# Patient Record
Sex: MALE | Race: WHITE
[De-identification: names, ages, dates, MRNs, and addresses within clinical notes are randomized per-mention and may not be internally consistent; named-entity substitution may affect disease eponyms.]

---
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
## 2021-10-26 NOTE — XR
EXAMINATION TYPE: XR nasal bone

 

DATE OF EXAM: 3/16/2018

 

COMPARISON: NONE

 

HISTORY: Pain

 

TECHNIQUE: 3 views obtained.

 

FINDINGS: Nasal bridge is intact. Maxillary styloid intact. There is a nasal septal deviation.

 

IMPRESSION: No acute fracture. Patients mother would like to be contacted once results from covid are in, needs clearance for child to return to . Please call at [phone],[facility].

## 2022-01-30 ENCOUNTER — HOSPITAL ENCOUNTER (EMERGENCY)
Dept: HOSPITAL 47 - EC | Age: 22
Discharge: HOME | End: 2022-01-30
Payer: COMMERCIAL

## 2022-01-30 VITALS — TEMPERATURE: 97 F | HEART RATE: 60 BPM

## 2022-01-30 VITALS — DIASTOLIC BLOOD PRESSURE: 71 MMHG | SYSTOLIC BLOOD PRESSURE: 121 MMHG | RESPIRATION RATE: 18 BRPM

## 2022-01-30 DIAGNOSIS — K52.9: ICD-10-CM

## 2022-01-30 DIAGNOSIS — E86.0: Primary | ICD-10-CM

## 2022-01-30 LAB
ALBUMIN SERPL-MCNC: 5.3 G/DL (ref 3.5–5)
ALP SERPL-CCNC: 71 U/L (ref 38–126)
ALT SERPL-CCNC: 20 U/L (ref 4–49)
ANION GAP SERPL CALC-SCNC: 12 MMOL/L
AST SERPL-CCNC: 33 U/L (ref 17–59)
BASOPHILS # BLD AUTO: 0 K/UL (ref 0–0.2)
BASOPHILS NFR BLD AUTO: 0 %
BUN SERPL-SCNC: 20 MG/DL (ref 9–20)
CALCIUM SPEC-MCNC: 10.7 MG/DL (ref 8.4–10.2)
CHLORIDE SERPL-SCNC: 103 MMOL/L (ref 98–107)
CO2 SERPL-SCNC: 23 MMOL/L (ref 22–30)
EOSINOPHIL # BLD AUTO: 0 K/UL (ref 0–0.7)
EOSINOPHIL NFR BLD AUTO: 0 %
ERYTHROCYTE [DISTWIDTH] IN BLOOD BY AUTOMATED COUNT: 5.36 M/UL (ref 4.3–5.9)
ERYTHROCYTE [DISTWIDTH] IN BLOOD: 13 % (ref 11.5–15.5)
GLUCOSE SERPL-MCNC: 104 MG/DL (ref 74–99)
HCT VFR BLD AUTO: 48.2 % (ref 39–53)
HGB BLD-MCNC: 16.8 GM/DL (ref 13–17.5)
KETONES UR QL STRIP.AUTO: (no result)
LDH SPEC-CCNC: 512 U/L (ref 313–618)
LIPASE SERPL-CCNC: 69 U/L (ref 23–300)
LYMPHOCYTES # SPEC AUTO: 1.6 K/UL (ref 1–4.8)
LYMPHOCYTES NFR SPEC AUTO: 16 %
MAGNESIUM SPEC-SCNC: 1.9 MG/DL (ref 1.6–2.3)
MCH RBC QN AUTO: 31.4 PG (ref 25–35)
MCHC RBC AUTO-ENTMCNC: 34.9 G/DL (ref 31–37)
MCV RBC AUTO: 90 FL (ref 80–100)
MONOCYTES # BLD AUTO: 0.3 K/UL (ref 0–1)
MONOCYTES NFR BLD AUTO: 3 %
NEUTROPHILS # BLD AUTO: 7.8 K/UL (ref 1.3–7.7)
NEUTROPHILS NFR BLD AUTO: 78 %
PH UR: 6.5 [PH] (ref 5–8)
PLATELET # BLD AUTO: 350 K/UL (ref 150–450)
POTASSIUM SERPL-SCNC: 4.5 MMOL/L (ref 3.5–5.1)
PROT SERPL-MCNC: 8.8 G/DL (ref 6.3–8.2)
PROT UR QL: (no result)
RBC UR QL: <1 /HPF (ref 0–5)
SODIUM SERPL-SCNC: 138 MMOL/L (ref 137–145)
SP GR UR: 1.04 (ref 1–1.03)
SQUAMOUS UR QL AUTO: <1 /HPF (ref 0–4)
UROBILINOGEN UR QL STRIP: <2 MG/DL (ref ?–2)
WBC # BLD AUTO: 10 K/UL (ref 4–11)
WBC # UR AUTO: <1 /HPF (ref 0–5)

## 2022-01-30 PROCEDURE — 84443 ASSAY THYROID STIM HORMONE: CPT

## 2022-01-30 PROCEDURE — 87591 N.GONORRHOEAE DNA AMP PROB: CPT

## 2022-01-30 PROCEDURE — 96361 HYDRATE IV INFUSION ADD-ON: CPT

## 2022-01-30 PROCEDURE — 82550 ASSAY OF CK (CPK): CPT

## 2022-01-30 PROCEDURE — 96374 THER/PROPH/DIAG INJ IV PUSH: CPT

## 2022-01-30 PROCEDURE — 87086 URINE CULTURE/COLONY COUNT: CPT

## 2022-01-30 PROCEDURE — 99284 EMERGENCY DEPT VISIT MOD MDM: CPT

## 2022-01-30 PROCEDURE — 93005 ELECTROCARDIOGRAM TRACING: CPT

## 2022-01-30 PROCEDURE — 85025 COMPLETE CBC W/AUTO DIFF WBC: CPT

## 2022-01-30 PROCEDURE — 36415 COLL VENOUS BLD VENIPUNCTURE: CPT

## 2022-01-30 PROCEDURE — 80053 COMPREHEN METABOLIC PANEL: CPT

## 2022-01-30 PROCEDURE — 81001 URINALYSIS AUTO W/SCOPE: CPT

## 2022-01-30 PROCEDURE — 96375 TX/PRO/DX INJ NEW DRUG ADDON: CPT

## 2022-01-30 PROCEDURE — 83735 ASSAY OF MAGNESIUM: CPT

## 2022-01-30 PROCEDURE — 83615 LACTATE (LD) (LDH) ENZYME: CPT

## 2022-01-30 PROCEDURE — 86140 C-REACTIVE PROTEIN: CPT

## 2022-01-30 PROCEDURE — 84100 ASSAY OF PHOSPHORUS: CPT

## 2022-01-30 PROCEDURE — 87491 CHLMYD TRACH DNA AMP PROBE: CPT

## 2022-01-30 PROCEDURE — 83690 ASSAY OF LIPASE: CPT

## 2022-01-30 NOTE — ED
Dizziness HPI





- General


Chief Complaint: Dizziness


Stated Complaint: Dizziness


Time Seen by Provider: 01/30/22 02:12


Source: patient, RN notes reviewed, old records reviewed


Mode of arrival: ambulatory


Limitations: no limitations





- History of Present Illness


Initial Comments: 





This is a 21-year-old male to the emergency department for evaluation of dizzi

ness lightheadedness.  Patient does admit to nausea vomiting diarrhea, weakness 

and not feeling well.  No recent travel history or sick contacts no fevers no 

other complaints.  Main symptoms are feeling sick for the last 12 hours or so 

progressively worsening and pain around his bladder area.


MD Complaint: dizziness, lightheadedness


-: hour(s)


Timing: gradual onset, waxing/waning


Description: lightheadedness, near-syncope


History of Same: No


History of Trauma: No


Severity: moderate


Improves With: remaining still


Worsens With: movement, exertion


Associated Symptoms: loss of appetite, weakness





- Related Data


                                Home Medications











 Medication  Instructions  Recorded  Confirmed


 


Ibuprofen [Motrin Ib] 600 mg PO Q6H PRN 07/16/18 10/15/19








                                  Previous Rx's











 Medication  Instructions  Recorded


 


Meclizine [Antivert] 25 mg PO TID PRN #15 tab 01/31/22











                                    Allergies











Allergy/AdvReac Type Severity Reaction Status Date / Time


 


No Known Allergies Allergy   Verified 01/31/22 10:51














Review of Systems


ROS Statement: 


Those systems with pertinent positive or pertinent negative responses have been 

documented in the HPI.





ROS Other: All systems not noted in ROS Statement are negative.





Past Medical History


Past Medical History: No Reported History


History of Any Multi-Drug Resistant Organisms: None Reported


Past Surgical History: No Surgical Hx Reported


Past Psychological History: No Psychological Hx Reported


Smoking Status: Never smoker


Past Alcohol Use History: Rare


Past Drug Use History: None Reported





General Exam


Limitations: no limitations


General appearance: alert, in no apparent distress


Head exam: Present: atraumatic, normocephalic, normal inspection


Eye exam: Present: normal appearance, PERRL, EOMI.  Absent: scleral icterus, 

conjunctival injection, periorbital swelling


ENT exam: Present: normal exam, mucous membranes moist


Neck exam: Present: normal inspection.  Absent: tenderness, meningismus, 

lymphadenopathy


Respiratory exam: Present: normal lung sounds bilaterally.  Absent: respiratory 

distress, wheezes, rales, rhonchi, stridor


Cardiovascular Exam: Present: regular rate, normal rhythm, normal heart sounds. 

 Absent: systolic murmur, diastolic murmur, rubs, gallop, clicks


GI/Abdominal exam: Present: soft, normal bowel sounds.  Absent: distended, 

tenderness, guarding, rebound, rigid


Extremities exam: Present: normal inspection, full ROM, normal capillary refill.

  Absent: tenderness, pedal edema, joint swelling, calf tenderness


Back exam: Present: normal inspection


Neurological exam: Present: alert, oriented X3, CN II-XII intact


Psychiatric exam: Present: normal affect, normal mood


Skin exam: Present: warm, dry, intact, normal color.  Absent: rash





Course


                                   Vital Signs











  01/30/22 01/30/22 01/30/22





  01:58 03:12 04:00


 


Temperature 96.9 F L  97 F L


 


Pulse Rate 57 L 57 L 60


 


Respiratory 20 20 22





Rate   


 


Blood Pressure 140/83 116/67 125/60


 


O2 Sat by Pulse 99 100 97





Oximetry   














  01/30/22





  04:34


 


Temperature 97 F L


 


Pulse Rate 60


 


Respiratory 18





Rate 


 


Blood Pressure 121/71


 


O2 Sat by Pulse 97





Oximetry 














- Reevaluation(s)


Reevaluation #1: 





Medical record is reviewed





Symptoms improved here in the ER





Patient informed results and questions answered








EKG Findings





- EKG Comments:


EKG Findings:: EKG is sinus bradycardia 59  QRS 90 





Medical Decision Making





- Medical Decision Making





21-year-old male presenting with dizziness.  Patient does have episodes of 

bradycardia.  Patient is nauseous and vomiting with gastroenteritis.  Patient 

will be given supportive care and can be discharged home currently feeling 

better





- Lab Data


Result diagrams: 


                                 01/30/22 02:27





                                 01/30/22 02:27


                                   Lab Results











  01/30/22 01/30/22 01/30/22 Range/Units





  02:14 02:14 02:27 


 


WBC    10.0  (4.0-11.0)  k/uL


 


RBC    5.36  (4.30-5.90)  m/uL


 


Hgb    16.8  (13.0-17.5)  gm/dL


 


Hct    48.2  (39.0-53.0)  %


 


MCV    90.0  (80.0-100.0)  fL


 


MCH    31.4  (25.0-35.0)  pg


 


MCHC    34.9  (31.0-37.0)  g/dL


 


RDW    13.0  (11.5-15.5)  %


 


Plt Count    350  (150-450)  k/uL


 


MPV    6.4  


 


Neutrophils %    78  %


 


Lymphocytes %    16  %


 


Monocytes %    3  %


 


Eosinophils %    0  %


 


Basophils %    0  %


 


Neutrophils #    7.8 H  (1.3-7.7)  k/uL


 


Lymphocytes #    1.6  (1.0-4.8)  k/uL


 


Monocytes #    0.3  (0-1.0)  k/uL


 


Eosinophils #    0.0  (0-0.7)  k/uL


 


Basophils #    0.0  (0-0.2)  k/uL


 


Sodium     (137-145)  mmol/L


 


Potassium     (3.5-5.1)  mmol/L


 


Chloride     ()  mmol/L


 


Carbon Dioxide     (22-30)  mmol/L


 


Anion Gap     mmol/L


 


BUN     (9-20)  mg/dL


 


Creatinine     (0.66-1.25)  mg/dL


 


Est GFR (CKD-EPI)AfAm     (>60 ml/min/1.73 sqM)  


 


Est GFR (CKD-EPI)NonAf     (>60 ml/min/1.73 sqM)  


 


Glucose     (74-99)  mg/dL


 


Calcium     (8.4-10.2)  mg/dL


 


Phosphorus     (2.5-4.5)  mg/dL


 


Magnesium     (1.6-2.3)  mg/dL


 


Total Bilirubin     (0.2-1.3)  mg/dL


 


AST     (17-59)  U/L


 


ALT     (4-49)  U/L


 


Alkaline Phosphatase     ()  U/L


 


Lactate Dehydrogenase     (313-618)  U/L


 


Creatine Kinase     ()  U/L


 


C-Reactive Protein     (<1.0)  mg/dL


 


Total Protein     (6.3-8.2)  g/dL


 


Albumin     (3.5-5.0)  g/dL


 


Lipase     ()  U/L


 


TSH     (0.465-4.680)  mIU/L


 


Urine Color  Yellow    


 


Urine Appearance  Clear    (Clear)  


 


Urine pH  6.5    (5.0-8.0)  


 


Ur Specific Gravity  1.037 H    (1.001-1.035)  


 


Urine Protein  1+ H    (Negative)  


 


Urine Glucose (UA)  Negative    (Negative)  


 


Urine Ketones  3+ H    (Negative)  


 


Urine Blood  Negative    (Negative)  


 


Urine Nitrite  Negative    (Negative)  


 


Urine Bilirubin  Negative    (Negative)  


 


Urine Urobilinogen  <2.0    (<2.0)  mg/dL


 


Ur Leukocyte Esterase  Negative    (Negative)  


 


Urine RBC  <1    (0-5)  /hpf


 


Urine WBC  <1    (0-5)  /hpf


 


Ur Squamous Epith Cells  <1    (0-4)  /hpf


 


Urine Mucus  Many H    (None)  /hpf


 


Chlamydia Source   Urine   


 


Chlamydia DNA (PCR)   Negative   (Neg,Equiv)  


 


N. gonorrhoeae Source   Urine   


 


N.gonorrhoeae DNA Probe   Negative   (Neg,Equiv)  














  01/30/22 01/30/22 Range/Units





  02:27 03:13 


 


WBC    (4.0-11.0)  k/uL


 


RBC    (4.30-5.90)  m/uL


 


Hgb    (13.0-17.5)  gm/dL


 


Hct    (39.0-53.0)  %


 


MCV    (80.0-100.0)  fL


 


MCH    (25.0-35.0)  pg


 


MCHC    (31.0-37.0)  g/dL


 


RDW    (11.5-15.5)  %


 


Plt Count    (150-450)  k/uL


 


MPV    


 


Neutrophils %    %


 


Lymphocytes %    %


 


Monocytes %    %


 


Eosinophils %    %


 


Basophils %    %


 


Neutrophils #    (1.3-7.7)  k/uL


 


Lymphocytes #    (1.0-4.8)  k/uL


 


Monocytes #    (0-1.0)  k/uL


 


Eosinophils #    (0-0.7)  k/uL


 


Basophils #    (0-0.2)  k/uL


 


Sodium  138   (137-145)  mmol/L


 


Potassium  4.5   (3.5-5.1)  mmol/L


 


Chloride  103   ()  mmol/L


 


Carbon Dioxide  23   (22-30)  mmol/L


 


Anion Gap  12   mmol/L


 


BUN  20   (9-20)  mg/dL


 


Creatinine  0.86   (0.66-1.25)  mg/dL


 


Est GFR (CKD-EPI)AfAm  >90   (>60 ml/min/1.73 sqM)  


 


Est GFR (CKD-EPI)NonAf  >90   (>60 ml/min/1.73 sqM)  


 


Glucose  104 H   (74-99)  mg/dL


 


Calcium  10.7 H   (8.4-10.2)  mg/dL


 


Phosphorus  4.3   (2.5-4.5)  mg/dL


 


Magnesium  1.9   (1.6-2.3)  mg/dL


 


Total Bilirubin  1.5 H   (0.2-1.3)  mg/dL


 


AST  33   (17-59)  U/L


 


ALT  20   (4-49)  U/L


 


Alkaline Phosphatase  71   ()  U/L


 


Lactate Dehydrogenase  512   (313-618)  U/L


 


Creatine Kinase   80  ()  U/L


 


C-Reactive Protein  <0.5   (<1.0)  mg/dL


 


Total Protein  8.8 H   (6.3-8.2)  g/dL


 


Albumin  5.3 H   (3.5-5.0)  g/dL


 


Lipase  69   ()  U/L


 


TSH  0.650   (0.465-4.680)  mIU/L


 


Urine Color    


 


Urine Appearance    (Clear)  


 


Urine pH    (5.0-8.0)  


 


Ur Specific Gravity    (1.001-1.035)  


 


Urine Protein    (Negative)  


 


Urine Glucose (UA)    (Negative)  


 


Urine Ketones    (Negative)  


 


Urine Blood    (Negative)  


 


Urine Nitrite    (Negative)  


 


Urine Bilirubin    (Negative)  


 


Urine Urobilinogen    (<2.0)  mg/dL


 


Ur Leukocyte Esterase    (Negative)  


 


Urine RBC    (0-5)  /hpf


 


Urine WBC    (0-5)  /hpf


 


Ur Squamous Epith Cells    (0-4)  /hpf


 


Urine Mucus    (None)  /hpf


 


Chlamydia Source    


 


Chlamydia DNA (PCR)    (Neg,Equiv)  


 


N. gonorrhoeae Source    


 


N.gonorrhoeae DNA Probe    (Neg,Equiv)  














Disposition


Clinical Impression: 


 Dehydration, Gastroenteritis





Disposition: HOME SELF-CARE


Condition: Good


Instructions (If sedation given, give patient instructions):  Gastroenteritis 

(ED), Dizziness (ED)


Is patient prescribed a controlled substance at d/c from ED?: No


Referrals: 


Beba Wong MD [Primary Care Provider] - 1-2 days

## 2022-01-31 ENCOUNTER — HOSPITAL ENCOUNTER (EMERGENCY)
Dept: HOSPITAL 47 - EC | Age: 22
Discharge: HOME | End: 2022-01-31
Payer: COMMERCIAL

## 2022-01-31 VITALS
RESPIRATION RATE: 18 BRPM | DIASTOLIC BLOOD PRESSURE: 63 MMHG | SYSTOLIC BLOOD PRESSURE: 100 MMHG | TEMPERATURE: 97.9 F | HEART RATE: 73 BPM

## 2022-01-31 DIAGNOSIS — R42: Primary | ICD-10-CM

## 2022-01-31 PROCEDURE — 99283 EMERGENCY DEPT VISIT LOW MDM: CPT

## 2022-01-31 NOTE — ED
General Adult HPI





- General


Chief complaint: Dizziness


Stated complaint: Dizziness/revisit


Time Seen by Provider: 01/31/22 11:30


Source: patient, family (mom), RN notes reviewed, old records reviewed


Mode of arrival: ambulatory


Limitations: no limitations





- History of Present Illness


Initial comments: 





21-year-old well-appearing male, alert and oriented 4, presents to the 

emergency room with dizziness that started on Saturday.  He was seen in the 

emergency room yesterday and diagnosed with dehydration and given IV fluids.  

Patient states that he has not had a fever but his dizziness had not resolved at

discharged and continues today.  States that it is better when he closes his 

eyes but worse with movement.  He denies any medical history.  He does have an 

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


-: days(s) (3)


Location: head


Associated Symptoms: other (dizziness)


Treatments Prior to Arrival: none





- Related Data


                                Home Medications











 Medication  Instructions  Recorded  Confirmed


 


Ibuprofen [Motrin Ib] 600 mg PO Q6H PRN 07/16/18 10/15/19








                                  Previous Rx's











 Medication  Instructions  Recorded


 


Meclizine [Antivert] 25 mg PO TID PRN #15 tab 01/31/22











                                    Allergies











Allergy/AdvReac Type Severity Reaction Status Date / Time


 


No Known Allergies Allergy   Verified 01/31/22 10:51














Review of Systems


ROS Statement: 


Those systems with pertinent positive or pertinent negative responses have been 

documented in the HPI.





ROS Other: All systems not noted in ROS Statement are negative.





Past Medical History


Past Medical History: No Reported History


History of Any Multi-Drug Resistant Organisms: None Reported


Past Surgical History: No Surgical Hx Reported


Past Psychological History: No Psychological Hx Reported


Smoking Status: Never smoker


Past Alcohol Use History: Rare


Past Drug Use History: None Reported





General Exam


Limitations: no limitations


General appearance: alert, in no apparent distress


Head exam: Present: atraumatic, normocephalic, normal inspection


Eye exam: Present: EOMI, other (Left iris nevus).  Absent: scleral icterus, 

conjunctival injection, periorbital swelling, periorbital tenderness


ENT exam: Present: normal exam, normal oropharynx, mucous membranes moist


Neck exam: Present: normal inspection, full ROM.  Absent: tenderness, 

meningismus, lymphadenopathy


Cardiovascular Exam: Present: regular rate


GI/Abdominal exam: Present: soft.  Absent: distended, tenderness


Extremities exam: Present: full ROM, normal capillary refill.  Absent: 

tenderness, pedal edema


Neurological exam: Present: alert, oriented X3, CN II-XII intact


  ** Expanded


Patient oriented to: Present: person, place, time


Speech: Present: fluid speech


Cranial nerves: EOM's Intact: Normal, Tongue Deviation: Normal


Upper motor neuron: [place] Neglect: Normal, Pronator Drift: Normal


Motor strength exam: RUE: 5, LUE: 5, RLE: 5, LLE: 5


Eye Response: (4) open spontaneously


Motor Response: (6) obeys commands


Verbal Response: (5) oriented


[place] Total: 15


Psychiatric exam: Present: normal affect, normal mood


Skin exam: Present: warm, dry, intact, normal color.  Absent: rash, cyanosis, 

diaphoretic





Course


                                   Vital Signs











  01/31/22





  10:51


 


Temperature 97.9 F


 


Pulse Rate 73


 


Respiratory 18





Rate 


 


Blood Pressure 100/63


 


O2 Sat by Pulse 100





Oximetry 














Medical Decision Making





- Medical Decision Making


21-year-old presents with dizziness that started on Saturday.  He was seen in 

the emergency room yesterday for same. 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 

he has had since birth.


Patient denies any fevers.  Denies any headache or vision changes. Patient has 

no evidence of nystagmus.  No focal neurological deficits.  He states that his 

dizziness is worse with movement, better when he is not moving or when his eyes 

are closed.  


He is feeling better after the Antivert.  This is likely benign positional 

vertigo.  He'll be given a prescription for Antivert and follow up with ENT Dr. Guerra.  Patient and mother are agreeable to this plan of care.  Case 

discussed with Dr. Springer who was also at bedside. 








Disposition


Clinical Impression: 


 Vertigo





Disposition: HOME SELF-CARE


Condition: Good


Instructions (If sedation given, give patient instructions):  Vertigo (ED)


Additional Instructions: 


Take Antivert as prescribed and follow-up with the ear nose and throat doctor as

 referred.  Return to the emergency room with a new or concerning symptoms.  Do 

not drive or operate heavy machinery until dizziness has resolved.


Prescriptions: 


Meclizine [Antivert] 25 mg PO TID PRN #15 tab


 PRN Reason: Vertigo


Is patient prescribed a controlled substance at d/c from ED?: No


Referrals: 


Taiwo Wong MD [Primary Care Provider] - 1-2 days


Wan Guerra MD [STAFF PHYSICIAN] - 1-2 days


Time of Disposition: 12:37